# Patient Record
Sex: MALE | Race: BLACK OR AFRICAN AMERICAN | URBAN - METROPOLITAN AREA
[De-identification: names, ages, dates, MRNs, and addresses within clinical notes are randomized per-mention and may not be internally consistent; named-entity substitution may affect disease eponyms.]

---

## 2018-07-27 ENCOUNTER — APPOINTMENT (RX ONLY)
Dept: URBAN - METROPOLITAN AREA CLINIC 24 | Facility: CLINIC | Age: 43
Setting detail: DERMATOLOGY
End: 2018-07-27

## 2018-07-27 DIAGNOSIS — L72.8 OTHER FOLLICULAR CYSTS OF THE SKIN AND SUBCUTANEOUS TISSUE: ICD-10-CM

## 2018-07-27 PROCEDURE — ? COUNSELING

## 2018-07-27 PROCEDURE — ? TREATMENT REGIMEN

## 2018-07-27 PROCEDURE — 99202 OFFICE O/P NEW SF 15 MIN: CPT

## 2018-07-27 ASSESSMENT — LOCATION DETAILED DESCRIPTION DERM: LOCATION DETAILED: RIGHT SUPERIOR LATERAL NECK

## 2018-07-27 ASSESSMENT — LOCATION SIMPLE DESCRIPTION DERM: LOCATION SIMPLE: NECK

## 2018-07-27 ASSESSMENT — LOCATION ZONE DERM: LOCATION ZONE: NECK

## 2018-07-27 NOTE — PROCEDURE: TREATMENT REGIMEN
Detail Level: Generalized
Plan: - advised patient we can remove this is bothersome \\n- patient states it’s not bothering him \\n- patient just wanted to make sure it’s not cancerous